# Patient Record
Sex: MALE | Race: OTHER
[De-identification: names, ages, dates, MRNs, and addresses within clinical notes are randomized per-mention and may not be internally consistent; named-entity substitution may affect disease eponyms.]

---

## 2018-09-15 ENCOUNTER — HOSPITAL ENCOUNTER (EMERGENCY)
Dept: HOSPITAL 61 - ER | Age: 28
Discharge: HOME | End: 2018-09-15
Payer: COMMERCIAL

## 2018-09-15 VITALS — WEIGHT: 165 LBS | BODY MASS INDEX: 25.01 KG/M2 | HEIGHT: 68 IN

## 2018-09-15 VITALS — DIASTOLIC BLOOD PRESSURE: 70 MMHG | SYSTOLIC BLOOD PRESSURE: 122 MMHG

## 2018-09-15 DIAGNOSIS — S61.215A: Primary | ICD-10-CM
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
## 2018-09-15 PROCEDURE — 12001 RPR S/N/AX/GEN/TRNK 2.5CM/<: CPT

## 2018-09-15 PROCEDURE — 73140 X-RAY EXAM OF FINGER(S): CPT

## 2018-09-15 NOTE — RAD
Indication:DEEP KNIFE CUT TO DISTAL 4TH DIGIT.  UNABLE TO REMOVE RING

 

TECHNIQUE: 3 views of left hand

 

COMPARISON:None

 

FINDINGS/

impression: No acute fracture or dislocation. No radiopaque foreign body. 

No soft tissue abnormality.

 

Electronically signed by: Primo Figueroa DO (9/15/2018 8:28 PM) [facility]

## 2018-09-15 NOTE — PHYS DOC
Past Medical History


Past Medical History:  No Pertinent History


Past Surgical History:  No Surgical History


Alcohol Use:  None


Drug Use:  None





Adult General


Chief Complaint


Chief Complaint:  LACERATION/AVULSION





HPI


HPI





Patient is a 27  year old male who presents to the emergency room with 

complaints of left fourth digit fingertip laceration. He states he was working 

at yard house when he accidentally cut his finger tip with a knife. he states 

that his last tetanus shot was within the last 5 years. Patient states the 

injury happened at approximately 1700 this evening. He currently reports his 

pain as a 4 out of 10 on the pain scale. He did not take anything for relief of 

his pain. He denies any decrease in sensation, numbness, tingling, or decreased 

range of motion of his left hand.





Review of Systems


Review of Systems





Constitutional: Denies fever or chills []


Musculoskeletal: Denies  joint pain []


Integument: Reports laceration to the tip of the left hand fourth digit


Neurologic: Denies headache, focal weakness or sensory changes []





All other systems were reviewed and found to be within normal limits, except as 

documented in this note.





Current Medications


Current Medications





Current Medications








 Medications


  (Trade)  Dose


 Ordered  Sig/Cesar  Start Time


 Stop Time Status Last Admin


Dose Admin


 


 Lidocaine HCl


  (Xylocaine-Mpf


 1% 2ml Vial)  6 ml  1X  ONCE  9/15/18 20:00


 9/15/18 20:01 DC 9/15/18 20:55


6 ML


 


 Neomycin/


 Polymyxin/


 Bacitracin


  (Triple


 Antibiotic


 Ointment)  1 pkt  1X  ONCE  9/15/18 21:30


 9/15/18 21:31 DC 9/15/18 21:12


1 PKT











Allergies


Allergies





Allergies








Coded Allergies Type Severity Reaction Last Updated Verified


 


  No Known Drug Allergies    12/27/16 No











Physical Exam


Physical Exam





Constitutional: Well developed, well nourished, no acute distress, non-toxic 

appearance. []


HENT: Normocephalic, atraumatic, bilateral external ears normal, nose normal. []


Eyes: PERRLA, conjunctiva normal, no discharge. [] 


Skin: Warm, dry, no erythema, no rash; 2 cm laceration to the tip of the 

lateral left fourth digit that minimally extends through the fingernail] 


Extremities: No tenderness, no cyanosis, no clubbing, ROM intact, no edema. [] 


Neurologic: Alert and oriented X 3, normal motor function, normal sensory 

function, no focal deficits noted. []


Psychologic: Affect normal, judgement normal, mood normal. []





Current Patient Data


Vital Signs





 Vital Signs








  Date Time  Temp Pulse Resp B/P (MAP) Pulse Ox O2 Delivery O2 Flow Rate FiO2


 


9/15/18 19:20 98.6 48 16 122/70 (87) 100 Room Air  





 98.6       











EKG


EKG


[]





Radiology/Procedures


Radiology/Procedures


Indication: laceration of left hand 4th digit []





Procedure: The patient was placed in the appropriate position and anesthesia 

around the 4th digit of left hand was administered, 1% LIDOCAINE was used. The 

area was then cleansed with a surgical scrub. The laceration was closed with 8 

single interrupted sutures using 4-0 ethilon.   





Total repaired wound length: 2 cm








The patient tolerated the procedure well 





Complications: none





Course & Med Decision Making


Course & Med Decision Making


Pertinent Labs and Imaging studies reviewed. (See chart for details)


Laceration of fourth digit of the left hand.


Repaired as described in the procedure note above. The dressing was applied and 

finger was placed in a aluminum finger splint. Advised patient to have sutures 

removed in 10 days, wear splint until sutures are removed. Off work tomorrow. 

May take Tylenol and ibuprofen as needed for pain. 


[]





Dragon Disclaimer


Dragon Disclaimer


This electronic medical record was generated, in whole or in part, using a 

voice recognition dictation system.





Departure


Departure


Impression:  


 Primary Impression:  


 Laceration of finger of left hand with damage to nail


Disposition:  01 HOME, SELF-CARE


Condition:  STABLE


Referrals:  


UNKNOWN PCP NAME (PCP)


Patient Instructions:  Fingertip Laceration





Additional Instructions:  


You may take Tylenol or ibuprofen as needed for pain. Keep the wound clean and 

dry, leave the dressing that was applied tonight in place for 24 hours, then he 

may change the dressing twice a day and as needed. Wear the aluminum finger 

splint until sutures are removed. Return to the ER or follow-up with your 

primary care doctor in 10 days to have the sutures removed.





Attending Signature


Attending Signature


I have reviewed the PA/NP's note and plan of care. I was available for 

consultation as needed during the patient's visit in the emergency department. 

I agree with the clinical impression, plan, and disposition.








Problem Qualifiers








 Primary Impression:  


 Laceration of finger of left hand with damage to nail


 Encounter type:  initial encounter  Finger:  ring finger  Foreign body presence

:  without foreign body  Qualified Codes:  S61.315A - Laceration without 

foreign body of left ring finger with damage to nail, initial encounter








MARY JANE REED Sep 15, 2018 21:07


VALENTINA CALDERÓN DO Sep 16, 2018 04:32